# Patient Record
Sex: FEMALE | Race: WHITE | Employment: UNEMPLOYED | URBAN - METROPOLITAN AREA
[De-identification: names, ages, dates, MRNs, and addresses within clinical notes are randomized per-mention and may not be internally consistent; named-entity substitution may affect disease eponyms.]

---

## 2018-08-30 ENCOUNTER — OFFICE VISIT CONVERTED (OUTPATIENT)
Dept: GASTROENTEROLOGY | Facility: CLINIC | Age: 48
End: 2018-08-30
Attending: INTERNAL MEDICINE

## 2019-06-19 ENCOUNTER — CONVERSION ENCOUNTER (OUTPATIENT)
Dept: NEUROLOGY | Facility: CLINIC | Age: 49
End: 2019-06-19

## 2019-06-19 ENCOUNTER — OFFICE VISIT CONVERTED (OUTPATIENT)
Dept: NEUROLOGY | Facility: CLINIC | Age: 49
End: 2019-06-19
Attending: PSYCHIATRY & NEUROLOGY

## 2019-08-06 ENCOUNTER — OFFICE VISIT CONVERTED (OUTPATIENT)
Dept: NEUROLOGY | Facility: CLINIC | Age: 49
End: 2019-08-06
Attending: PSYCHIATRY & NEUROLOGY

## 2020-07-29 ENCOUNTER — OFFICE VISIT CONVERTED (OUTPATIENT)
Dept: NEUROLOGY | Facility: CLINIC | Age: 50
End: 2020-07-29
Attending: PSYCHIATRY & NEUROLOGY

## 2020-12-07 ENCOUNTER — HOSPITAL ENCOUNTER (OUTPATIENT)
Dept: GENERAL RADIOLOGY | Facility: HOSPITAL | Age: 50
Discharge: HOME | End: 2020-12-07
Attending: PSYCHIATRY & NEUROLOGY

## 2021-05-13 NOTE — PROGRESS NOTES
Progress Note      Patient Name: Pili Bland   Patient ID: [id number]   Sex: Female   YOB: 1970    Referring Provider: Ree BARBA    Visit Date: July 29, 2020    Provider: Shmuel Caro MD   Location: [facility]   Location Address: [address] [place] [address]  [id number]   Location Phone: [phone]          Chief Complaint     F/u migraines, numbness. Prior pt of Dr. Cisneros.       History Of Present Illness  Pili Bland is a 49 year old /White female who presents today to [facility] today referred from Ree BARBA.      -year-old woman evaluated for memory loss as well as migraine headaches, lack of sleep, anxiety.  She states that this is been ongoing for 10 years.  She states that she has seen many doctors and has been diagnosed of fibromyalgia by rheumatology, pain in her feet, joints knees and she supposed to get partial knee replacements.  She states that she is under a lot of stress.  She states that has problems with her boyfriend as well as her job.  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  She cannot concentrate.  She states that there is racing thoughts in her head.  She has problems sleeping at night.  She states that doctors jus give her the runaround and they do not know what is wrong with her.  She was told to go from one specialist to the next.  She was seeing Dr. Cisneros neurologist in this practice in the past and had an abnormal MRI and a work-up was initiated which was negative for vasculitis.  She is independent with activities of daily living.  She drove her 13-year-old daughter with her today.    She states that she has had headaches for 20+ years.  It is been daily for years.  He gets severe at least 2 or 3 times a week.  It was severe daily.  It is throbbing headache on the top of her head and aching and sharp in the back of her head.  There is associated light and noise  sensitivity.       Past Medical History  Anemia; Anxiety and depression; Fibromyalgia; Reflux         Past Surgical History  Adenoidectomy; Ceasarian section; Colonoscopy; EGD; Tonsilectomy         Medication List  amitriptyline 75 mg oral tablet; apple cider vinegar 300 mg oral tablet; biotin 5 mg oral tablet; cranberry 400 mg oral capsule; dicyclomine 10 mg oral capsule; Fish Oil 300-500 mg oral capsule; ibuprofen 800 mg oral tablet; loratadine 10 mg oral tablet; magnesium 250 mg oral tablet; meloxicam 15 mg oral tablet; milk thistle 175 mg oral tablet; mirtazapine 30 mg oral tablet; Neurontin 300 mg oral capsule; risperidone 4 mg oral tablet; tizanidine 2 mg oral capsule; Vitamin B-12 500 mcg oral tablet; Vitamin B-6 25 mg oral tablet         Allergy List  NO KNOWN DRUG ALLERGIES         Family Medical History  - No Family History of Colorectal Cancer         Social History  Alcohol (Light); Denies substance abuse (Never); Tobacco (Never)         Review of Systems  · Constitutional  o Denies  o : chills, excessive sweating, fatigue, fever, sycope/passing out, weight gain, weight loss  · Eyes  o Denies  o : changes in vision, blurry vision, double vision  · HENT  o Denies  o : loss of hearing, ringing in the ears, ear aches, sore throat, nasal congestion, sinus pain, nose bleeds, seasonal allergies  · Cardiovascular  o Denies  o : blood clots, swollen legs, anemia, easy burising or bleeding, transfusions  · Respiratory  o Denies  o : shortness of breath, dry cough, productive cough, pneumonia, COPD  · Gastrointestinal  o Denies  o : difficulty swallowing, reflux  · Genitourinary  o Denies  o : incontinence  · Neurologic  o Denies  o : headache, seizure, stroke, tremor, loss of balance, falls, dizziness/vertigo, difficulty with sleep, numbness/tingling/paresthesia , difficulty with coordination, difficulty with dexterity, weakness  · Musculoskeletal  o Denies  o : neck stiffness/pain, swollen lymph nodes, muscle  aches, joint pain, weakness, spasms, sciatica, pain radiating in arm, pain radiating in leg, low back pain  · Endocrine  o Denies  o : diabetes, thyroid disorder  · Psychiatric  o Denies  o : anxiety, depression      Vitals  Date Time BP Position Site L\R Cuff Size HR RR TEMP (F) WT  HT  BMI kg/m2 BSA m2 O2 Sat HC       07/29/2020 08:49 AM        97.6               Physical Examination     She is alert, fluent, phasic, follows commands well.  She is able to give me history without any difficulty.  Her Mini-Mental status score is 28 out of 30.  She missed 2 out of 3 recent objects.  She can tell me recent events.  She can tell me the president [place].  She can tell me regarding her family as well as her job.  She can tell me regarding her doctor's appointments and her doctors are in [place].  Optic disks are normal bilaterally, visual fields full, EOMs full directions gaze, facial strength is full.  There is no weakness of the upper or lower extremities individual muscle testing.  There is no atrophy or fasciculations.  Reflexes are decreased in biceps, triceps, patellar's and ankles.  Cerebellar testing is intact.  Station and gait she is able to tiptoe, heel walk, kale and tandem without difficulty.           Assessment  · Anxiety     300.00/F41.9  I will refer her to a new psychiatrist to see if she can be followed up for anxiety and depression.  · Memory loss     780.93/R41.3  I told her that she does not have dementia. Her memory loss secondary to her psychiatric problems. I am referring her to psychiatry.    40 minutes was spent for this high complexity visit 1/2 the time was spent face-to-face with the patient for examination, counseling, planning and recommendations.  · Chronic migraine without aura     346.70/G43.709  I told her that she needs to be treated for her psychiatric problems before her headaches improve. He has chronic migraine headaches. She will be referred back to our clinic in the  future as needed.  · MRI of brain abnormal     793.0/R90.89  I told her that I will repeat an MRI of the brain and compared to a previous MRI last year. It is most likely nonspecific white matter changes which are not clinically significant.  · Psychosomatic disorder     306.9/F45.9  She has significant amount of somatic complaints with pain. I told her that I would recommend for her to get a second opinion regarding her knee surgery.    Problems Reconciled  Plan  · Orders  o MRI brain wo contrast ([id number]) - 793.0/R90.89, 300.00/F41.9, 780.93/R41.3, 306.9/F45.9 - 07/29/2020  o Psychiatric Consultation (PSYCH) - 793.0/R90.89, 300.00/F41.9, 780.93/R41.3, 306.9/F45.9 - 07/29/2020   Dr. Guillaume  · Medications  o Medications have been Reconciled  o Transition of Care or Provider Policy  · Instructions  o Encouraged to follow-up with Primary Care Provider for preventative care.            Electronically Signed by: Shmuel Caro MD -Author on July 29, 2020 10:38:23 AM

## 2021-05-15 VITALS
HEIGHT: 67 IN | DIASTOLIC BLOOD PRESSURE: 71 MMHG | HEART RATE: 71 BPM | SYSTOLIC BLOOD PRESSURE: 115 MMHG | WEIGHT: 245 LBS | BODY MASS INDEX: 38.45 KG/M2

## 2021-05-15 VITALS — TEMPERATURE: 97.6 F

## 2021-05-16 VITALS
BODY MASS INDEX: 37.9 KG/M2 | OXYGEN SATURATION: 100 % | HEIGHT: 67 IN | HEART RATE: 91 BPM | RESPIRATION RATE: 12 BRPM | WEIGHT: 241.44 LBS | SYSTOLIC BLOOD PRESSURE: 124 MMHG | DIASTOLIC BLOOD PRESSURE: 81 MMHG

## 2022-03-24 ENCOUNTER — OFFICE VISIT (OUTPATIENT)
Dept: NEUROLOGY | Facility: CLINIC | Age: 52
End: 2022-03-24

## 2022-03-24 VITALS
BODY MASS INDEX: 43.16 KG/M2 | WEIGHT: 275 LBS | DIASTOLIC BLOOD PRESSURE: 72 MMHG | SYSTOLIC BLOOD PRESSURE: 123 MMHG | HEART RATE: 82 BPM | HEIGHT: 67 IN

## 2022-03-24 DIAGNOSIS — R90.89 ABNORMAL BRAIN MRI: Primary | ICD-10-CM

## 2022-03-24 PROCEDURE — 99215 OFFICE O/P EST HI 40 MIN: CPT | Performed by: PSYCHIATRY & NEUROLOGY

## 2022-03-24 RX ORDER — MECLIZINE HCL 12.5 MG/1
12.5 TABLET ORAL 3 TIMES DAILY PRN
COMMUNITY

## 2022-03-24 RX ORDER — CYCLOBENZAPRINE HCL 5 MG
5 TABLET ORAL
COMMUNITY
Start: 2022-02-15

## 2022-03-24 RX ORDER — BECLOMETHASONE DIPROPIONATE 80 UG/1
AEROSOL, METERED NASAL
COMMUNITY

## 2022-03-24 RX ORDER — LORATADINE 10 MG/1
10 TABLET ORAL DAILY
COMMUNITY

## 2022-03-24 RX ORDER — ALPRAZOLAM 0.5 MG/1
0.5 TABLET ORAL
COMMUNITY
Start: 2022-02-15

## 2022-03-24 RX ORDER — ALBUTEROL SULFATE 90 UG/1
2 AEROSOL, METERED RESPIRATORY (INHALATION)
COMMUNITY
Start: 2021-11-02 | End: 2022-11-03

## 2022-03-24 RX ORDER — GABAPENTIN 800 MG/1
800 TABLET ORAL 3 TIMES DAILY
COMMUNITY
Start: 2022-02-15

## 2022-03-24 RX ORDER — CELECOXIB 200 MG/1
200 CAPSULE ORAL DAILY
COMMUNITY
Start: 2022-01-10

## 2022-03-24 RX ORDER — DULOXETIN HYDROCHLORIDE 60 MG/1
60 CAPSULE, DELAYED RELEASE ORAL
COMMUNITY
Start: 2021-11-02

## 2022-03-24 RX ORDER — VENLAFAXINE 75 MG/1
75 TABLET ORAL
COMMUNITY
Start: 2022-01-10

## 2022-03-24 RX ORDER — HYDROXYZINE HYDROCHLORIDE 25 MG/1
25 TABLET, FILM COATED ORAL
COMMUNITY
Start: 2022-01-10

## 2022-03-24 RX ORDER — RIZATRIPTAN BENZOATE 10 MG/1
10 TABLET ORAL AS NEEDED
COMMUNITY
Start: 2022-01-10 | End: 2023-01-11

## 2022-03-24 RX ORDER — MIRTAZAPINE 30 MG/1
30 TABLET, FILM COATED ORAL DAILY
COMMUNITY

## 2022-03-24 RX ORDER — RISPERIDONE 2 MG/1
2 TABLET ORAL DAILY
COMMUNITY

## 2022-03-24 NOTE — PROGRESS NOTES
"Chief Complaint  Neurologic Problem    Subjective          Pili Bland is a 51 y.o. female who presents to [facility]  History of Present Illness  51-year-old woman here for follow-up of her symptoms of fatigue, pain all over her body, problems with walking, problems with picking up her feet.  She was under the impression that her doctors told her that this is multiple sclerosis.  She tells me that her doctors interpret the MRI of the brain that was read the first time in the second time as having findings of multiple sclerosis.  The actual report that was dictated on December 7, 2020 is as follows.  No significant change from 6/28/2019.  A few small areas of white matter signal abnormality, may reflect sequela of chronic small vessel ischemic change.  Demyelinating process could have a similar appearance.  This area has shown no restricted diffusion to suggest an active process.    She states that she had recent surgery to her right knee.    Initially she told me she has not had any focal neurologic deficits suggestive of a stroke.  Then I explained to her what focal neurologic deficits were in relation to multiple sclerosis in a young adult.  She has a positive review of systems of all the neurologic symptoms after  I discussed these symptoms including focal weakness, numbness, double vision, slurring of speech, gait abnormalities.    Patient is reported not to smoke.  She has no history of hypertension or diabetes.    She is requesting for lumbar puncture to be performed.  Objective   Vital Signs:   /72 (BP Location: Right arm, Patient Position: Sitting, Cuff Size: Adult)   Pulse 82   Ht 170.2 cm (67.01\")   Wt 125 kg (275 lb)   BMI 43.06 kg/m²     Physical Exam   Alert, fluent, phasic, follows commands well.  Optic disc are normal bilaterally visual fields full EMs full directions gaze, facial strength is full.  There is no weakness of the upper or lower " extremities.  And Station gait she is able to ambulate with normal arm swing.  She is able to tiptoe, heel walk without any difficulty.  There is no spasticity.        Assessment and Plan  Diagnoses and all orders for this visit:    1. Abnormal brain MRI (Primary)  Assessment & Plan:  I discussed with her that one cannot make a diagnosis of multiple sclerosis based on the findings noted on MRI.  I discussed with her that multiple sclerosis is a clinical diagnosis.  I discussed with her that I do not think she has multiple sclerosis however she is insisting of having a lumbar puncture performed.  I discussed with her the complications associated with lumbar puncture including bleeding, infection, post spinal headache, chronic back pain.  I will repeat another MRI of the brain.  I discussed with her that if the lumbar puncture is negative then no further neurologic work-up is needed and she can follow-up with rheumatology and psychiatry.         Total time spent with the patient and coordinating patient care was 40 minutes.    Follow Up  No follow-ups on file.  Patient was given instructions and counseling regarding her condition or for health maintenance advice. Please see specific information pulled into the AVS if appropriate.

## 2022-03-24 NOTE — ASSESSMENT & PLAN NOTE
I discussed with her that one cannot make a diagnosis of multiple sclerosis based on the findings noted on MRI.  I discussed with her that multiple sclerosis is a clinical diagnosis.  I discussed with her that I do not think she has multiple sclerosis however she is insisting of having a lumbar puncture performed.  I discussed with her the complications associated with lumbar puncture including bleeding, infection, post spinal headache, chronic back pain.  I will repeat another MRI of the brain.  I discussed with her that if the lumbar puncture is negative then no further neurologic work-up is needed and she can follow-up with rheumatology and psychiatry.